# Patient Record
Sex: FEMALE | Race: BLACK OR AFRICAN AMERICAN
[De-identification: names, ages, dates, MRNs, and addresses within clinical notes are randomized per-mention and may not be internally consistent; named-entity substitution may affect disease eponyms.]

---

## 2019-02-14 ENCOUNTER — HOSPITAL ENCOUNTER (EMERGENCY)
Dept: HOSPITAL 19 - COL.ER | Age: 29
Discharge: HOME | End: 2019-02-14
Payer: MEDICAID

## 2019-02-14 VITALS — DIASTOLIC BLOOD PRESSURE: 74 MMHG | SYSTOLIC BLOOD PRESSURE: 96 MMHG | HEART RATE: 91 BPM

## 2019-02-14 VITALS — HEIGHT: 70.98 IN | WEIGHT: 158.29 LBS | BODY MASS INDEX: 22.16 KG/M2

## 2019-02-14 VITALS — TEMPERATURE: 98.5 F

## 2019-02-14 DIAGNOSIS — R10.2: ICD-10-CM

## 2019-02-14 DIAGNOSIS — Z3A.14: ICD-10-CM

## 2019-02-14 DIAGNOSIS — O26.891: Primary | ICD-10-CM

## 2019-02-14 DIAGNOSIS — Z90.89: ICD-10-CM

## 2019-02-14 LAB
PH UR STRIP.AUTO: 8 [PH] (ref 5–8)
RBC # UR: (no result) /HPF
SP GR UR STRIP.AUTO: 1.01 (ref 1–1.03)
SQUAMOUS # URNS: (no result) /HPF
URN COLLECT METHOD CLASS: (no result)

## 2019-03-23 ENCOUNTER — HOSPITAL ENCOUNTER (EMERGENCY)
Dept: HOSPITAL 19 - COL.ER | Age: 29
LOS: 1 days | Discharge: HOME | End: 2019-03-24
Payer: MEDICAID

## 2019-03-23 VITALS — BODY MASS INDEX: 22.04 KG/M2 | WEIGHT: 157.41 LBS | HEIGHT: 70.98 IN

## 2019-03-23 VITALS — TEMPERATURE: 98.3 F

## 2019-03-23 DIAGNOSIS — Z3A.18: ICD-10-CM

## 2019-03-23 DIAGNOSIS — O21.9: Primary | ICD-10-CM

## 2019-03-23 LAB
ANION GAP SERPL CALC-SCNC: 6 MMOL/L (ref 7–16)
BASOPHILS # BLD: 0 10*3/UL (ref 0–0.2)
BASOPHILS NFR BLD AUTO: 0.4 % (ref 0–2)
BUN SERPL-MCNC: 12 MG/DL (ref 7–17)
CALCIUM SERPL-MCNC: 8.6 MG/DL (ref 8.4–10.2)
CHLORIDE SERPL-SCNC: 102 MMOL/L (ref 98–107)
CO2 SERPL-SCNC: 25 MMOL/L (ref 22–30)
CREAT SERPL-SCNC: 0.63 MG/DL (ref 0.52–1.25)
EOSINOPHIL # BLD: 0.2 10*3/UL (ref 0–0.7)
EOSINOPHIL NFR BLD: 2.2 % (ref 0–4)
ERYTHROCYTE [DISTWIDTH] IN BLOOD BY AUTOMATED COUNT: 14.6 % (ref 11.5–14.5)
GLUCOSE SERPL-MCNC: 77 MG/DL (ref 74–106)
GRANULOCYTES # BLD AUTO: 58.6 % (ref 42.2–75.2)
HCT VFR BLD AUTO: 32.2 % (ref 37–47)
HGB BLD-MCNC: 10.2 G/DL (ref 12.5–16)
LYMPHOCYTES # BLD: 2.4 10*3/UL (ref 1.2–3.4)
LYMPHOCYTES NFR BLD: 31.6 % (ref 20–51)
MCH RBC QN AUTO: 26 PG (ref 27–31)
MCHC RBC AUTO-ENTMCNC: 32 G/DL (ref 33–37)
MCV RBC AUTO: 83 FL (ref 80–100)
MONOCYTES # BLD: 0.5 10*3/UL (ref 0.1–0.6)
MONOCYTES NFR BLD AUTO: 6.9 % (ref 1.7–9.3)
NEUTROPHILS # BLD: 4.5 10*3/UL (ref 1.4–6.5)
PLATELET # BLD AUTO: 229 K/MM3 (ref 130–400)
PMV BLD AUTO: 11.3 FL (ref 7.4–10.4)
POTASSIUM SERPL-SCNC: 3.6 MMOL/L (ref 3.4–5)
RBC # BLD AUTO: 3.88 M/MM3 (ref 4.1–5.3)
SODIUM SERPL-SCNC: 133 MMOL/L (ref 137–145)

## 2019-03-24 VITALS — SYSTOLIC BLOOD PRESSURE: 105 MMHG | DIASTOLIC BLOOD PRESSURE: 73 MMHG | HEART RATE: 77 BPM

## 2019-03-24 LAB
PH UR STRIP.AUTO: 7 [PH] (ref 5–8)
RBC # UR: (no result) /HPF
SP GR UR STRIP.AUTO: 1 (ref 1–1.03)
SQUAMOUS # URNS: (no result) /HPF
URN COLLECT METHOD CLASS: (no result)

## 2019-06-17 ENCOUNTER — HOSPITAL ENCOUNTER (INPATIENT)
Dept: HOSPITAL 19 - LDRO | Age: 29
LOS: 1 days | Discharge: HOME | End: 2019-06-18
Attending: OBSTETRICS & GYNECOLOGY | Admitting: OBSTETRICS & GYNECOLOGY
Payer: MEDICAID

## 2019-06-17 VITALS — SYSTOLIC BLOOD PRESSURE: 120 MMHG | DIASTOLIC BLOOD PRESSURE: 73 MMHG | HEART RATE: 90 BPM

## 2019-06-17 VITALS — HEART RATE: 87 BPM | TEMPERATURE: 97.8 F | SYSTOLIC BLOOD PRESSURE: 114 MMHG | DIASTOLIC BLOOD PRESSURE: 69 MMHG

## 2019-06-17 VITALS — SYSTOLIC BLOOD PRESSURE: 123 MMHG | DIASTOLIC BLOOD PRESSURE: 70 MMHG | HEART RATE: 75 BPM

## 2019-06-17 VITALS — HEART RATE: 106 BPM | DIASTOLIC BLOOD PRESSURE: 71 MMHG | SYSTOLIC BLOOD PRESSURE: 116 MMHG

## 2019-06-17 VITALS — SYSTOLIC BLOOD PRESSURE: 126 MMHG | HEART RATE: 110 BPM | DIASTOLIC BLOOD PRESSURE: 71 MMHG

## 2019-06-17 VITALS — DIASTOLIC BLOOD PRESSURE: 60 MMHG | HEART RATE: 114 BPM | SYSTOLIC BLOOD PRESSURE: 135 MMHG

## 2019-06-17 VITALS — DIASTOLIC BLOOD PRESSURE: 87 MMHG | SYSTOLIC BLOOD PRESSURE: 134 MMHG | HEART RATE: 106 BPM

## 2019-06-17 VITALS — SYSTOLIC BLOOD PRESSURE: 117 MMHG | HEART RATE: 96 BPM | DIASTOLIC BLOOD PRESSURE: 69 MMHG

## 2019-06-17 VITALS — SYSTOLIC BLOOD PRESSURE: 123 MMHG | HEART RATE: 112 BPM | DIASTOLIC BLOOD PRESSURE: 70 MMHG

## 2019-06-17 VITALS — HEART RATE: 108 BPM | SYSTOLIC BLOOD PRESSURE: 116 MMHG | DIASTOLIC BLOOD PRESSURE: 60 MMHG

## 2019-06-17 VITALS — WEIGHT: 168.43 LBS | HEIGHT: 70 IN | BODY MASS INDEX: 24.11 KG/M2

## 2019-06-17 VITALS — SYSTOLIC BLOOD PRESSURE: 127 MMHG | DIASTOLIC BLOOD PRESSURE: 64 MMHG | HEART RATE: 113 BPM

## 2019-06-17 VITALS — SYSTOLIC BLOOD PRESSURE: 120 MMHG | DIASTOLIC BLOOD PRESSURE: 65 MMHG | HEART RATE: 108 BPM

## 2019-06-17 VITALS — TEMPERATURE: 97.2 F

## 2019-06-17 VITALS — HEART RATE: 110 BPM | DIASTOLIC BLOOD PRESSURE: 79 MMHG | SYSTOLIC BLOOD PRESSURE: 126 MMHG

## 2019-06-17 VITALS — TEMPERATURE: 97.8 F | SYSTOLIC BLOOD PRESSURE: 114 MMHG | HEART RATE: 87 BPM | DIASTOLIC BLOOD PRESSURE: 69 MMHG

## 2019-06-17 VITALS — DIASTOLIC BLOOD PRESSURE: 70 MMHG | SYSTOLIC BLOOD PRESSURE: 115 MMHG | HEART RATE: 101 BPM

## 2019-06-17 VITALS — DIASTOLIC BLOOD PRESSURE: 71 MMHG | SYSTOLIC BLOOD PRESSURE: 145 MMHG | HEART RATE: 96 BPM

## 2019-06-17 VITALS — DIASTOLIC BLOOD PRESSURE: 65 MMHG | SYSTOLIC BLOOD PRESSURE: 122 MMHG | HEART RATE: 100 BPM

## 2019-06-17 VITALS — DIASTOLIC BLOOD PRESSURE: 73 MMHG | HEART RATE: 103 BPM | SYSTOLIC BLOOD PRESSURE: 124 MMHG

## 2019-06-17 VITALS — HEART RATE: 102 BPM | DIASTOLIC BLOOD PRESSURE: 74 MMHG | SYSTOLIC BLOOD PRESSURE: 130 MMHG

## 2019-06-17 VITALS — HEART RATE: 107 BPM | DIASTOLIC BLOOD PRESSURE: 83 MMHG | SYSTOLIC BLOOD PRESSURE: 131 MMHG

## 2019-06-17 VITALS — HEART RATE: 110 BPM | SYSTOLIC BLOOD PRESSURE: 128 MMHG | DIASTOLIC BLOOD PRESSURE: 63 MMHG

## 2019-06-17 VITALS — SYSTOLIC BLOOD PRESSURE: 132 MMHG | DIASTOLIC BLOOD PRESSURE: 83 MMHG | HEART RATE: 101 BPM

## 2019-06-17 VITALS — SYSTOLIC BLOOD PRESSURE: 113 MMHG | DIASTOLIC BLOOD PRESSURE: 71 MMHG | HEART RATE: 91 BPM

## 2019-06-17 DIAGNOSIS — A56.8: ICD-10-CM

## 2019-06-17 DIAGNOSIS — F41.9: ICD-10-CM

## 2019-06-17 DIAGNOSIS — Z3A.30: ICD-10-CM

## 2019-06-17 DIAGNOSIS — O99.323: ICD-10-CM

## 2019-06-17 DIAGNOSIS — F12.90: ICD-10-CM

## 2019-06-17 LAB
ALBUMIN SERPL-MCNC: 3.4 GM/DL (ref 3.5–5)
ALP SERPL-CCNC: 74 U/L (ref 50–136)
ALT SERPL-CCNC: 6 U/L (ref 9–52)
ANION GAP SERPL CALC-SCNC: 8 MMOL/L (ref 7–16)
AST SERPL-CCNC: 29 U/L (ref 15–37)
BASOPHILS # BLD: 0 10*3/UL (ref 0–0.2)
BASOPHILS NFR BLD AUTO: 0.4 % (ref 0–2)
BILIRUB SERPL-MCNC: 0.3 MG/DL (ref 0–1)
BUN SERPL-MCNC: 7 MG/DL (ref 7–17)
CALCIUM SERPL-MCNC: 8.6 MG/DL (ref 8.4–10.2)
CHLORIDE SERPL-SCNC: 105 MMOL/L (ref 98–107)
CO2 SERPL-SCNC: 25 MMOL/L (ref 22–30)
CREAT SERPL-SCNC: 0.54 UMOL/L (ref 0.52–1.25)
DRUGS UR SCN NOM: NEGATIVE NG/ML
EOSINOPHIL # BLD: 0.1 10*3/UL (ref 0–0.7)
EOSINOPHIL NFR BLD: 1.3 % (ref 0–4)
ERYTHROCYTE [DISTWIDTH] IN BLOOD BY AUTOMATED COUNT: 14.8 % (ref 11.5–14.5)
GLUCOSE SERPL-MCNC: 76 MG/DL (ref 74–106)
GRANULOCYTES # BLD AUTO: 66.4 % (ref 42.2–75.2)
HCT VFR BLD AUTO: 34.3 % (ref 37–47)
HGB BLD-MCNC: 10.9 G/DL (ref 12.5–16)
LYMPHOCYTES # BLD: 2 10*3/UL (ref 1.2–3.4)
LYMPHOCYTES NFR BLD: 22.2 % (ref 20–51)
MCH RBC QN AUTO: 26 PG (ref 27–31)
MCHC RBC AUTO-ENTMCNC: 32 G/DL (ref 33–37)
MCV RBC AUTO: 82 FL (ref 80–100)
MONOCYTES # BLD: 0.8 10*3/UL (ref 0.1–0.6)
MONOCYTES NFR BLD AUTO: 9.3 % (ref 1.7–9.3)
NEUTROPHILS # BLD: 5.9 10*3/UL (ref 1.4–6.5)
PH UR STRIP.AUTO: 7 [PH] (ref 5–8)
PLATELET # BLD AUTO: 209 K/MM3 (ref 130–400)
PMV BLD AUTO: 12.4 FL (ref 7.4–10.4)
POTASSIUM SERPL-SCNC: 3.3 MMOL/L (ref 3.4–5)
PROT SERPL-MCNC: 6.8 GM/DL (ref 6.4–8.2)
RBC # BLD AUTO: 4.17 M/MM3 (ref 4.1–5.3)
RBC # UR: (no result) /HPF
SODIUM SERPL-SCNC: 138 MMOL/L (ref 137–145)
SP GR UR STRIP.AUTO: 1.02 (ref 1–1.03)
SQUAMOUS # URNS: (no result) /HPF
URINE LEUKOCYTE ESTERASE: (no result)
URN COLLECT METHOD CLASS: (no result)
WBC # UR: (no result) /HPF

## 2019-06-17 PROCEDURE — 10D17ZZ EXTRACTION OF PRODUCTS OF CONCEPTION, RETAINED, VIA NATURAL OR ARTIFICIAL OPENING: ICD-10-PCS | Performed by: OBSTETRICS & GYNECOLOGY

## 2019-06-17 NOTE — NUR
Pt up to the bathroom with standby assist and without complications. Pt able
to void. Candie-care done. Pt transferred to room 215 ambulatory. Oriented to
room, bed and call light within reach. Plan of care reviewed.

## 2019-06-17 NOTE — NUR
- Dr. Vargas and [facility] NICU transport team at the bedside. FHR
tracing reviewed.
 
- Mag infusion turned off per Dr. Vargas's order.
 
- Bedside ultrasound done per Dr. Vargas. Vertex positon verified.
 
- SVE per Dr. Vargas 7/80/-2. AROM with moderate amount of clear fluid.
 
- SVE per Dr. Vargas 880/-2.
 
- SVE per Dr. Vargas 980/-2.
 
- SVE per Dr. Vargas 80/-2. Orders to start pitocin received.
 
- Pitocin started at 2ml/hr per order.
 
- SVE per Dr. Vargas 9100/-2
 
- Pt reports feeling pressure. Dr Vargas and NICU transport team back at
the bedside. Pt set up for delivery.
 
- Pushing started.  of viable female . Cords clamped and cut.
Care of the  given to NICU transport team at the bedside.
 
-  of placenta. Retained membranes per Dr. Vargas. Additional staff and
[facility] called to bedside for assistance.
 
- APRIL Marley at the bedside. See anesthesia record for details.
 
- Pitocin started 333ml/hr per order and protocol. Fundus firm and lochia
WNL.

## 2019-06-17 NOTE — NUR
PATIENT TO LR 3. PATIENT COMPLAINS OF LEAKING OF FLUID. PATIENT HAS HAD
MODERATE CONTRACTIONS AS WELL. PATIENT STATES SHE HAD MODERATE BLEEEDING 1
WEEK AGO WITH INTERCOURSE. PATIENT ON EFM, PATIENT BREATHING THROUGH
CONTRACTIONS.

## 2019-06-18 VITALS — DIASTOLIC BLOOD PRESSURE: 72 MMHG | HEART RATE: 100 BPM | TEMPERATURE: 98 F | SYSTOLIC BLOOD PRESSURE: 112 MMHG

## 2019-06-18 VITALS — SYSTOLIC BLOOD PRESSURE: 134 MMHG | TEMPERATURE: 97.5 F | HEART RATE: 85 BPM | DIASTOLIC BLOOD PRESSURE: 65 MMHG

## 2019-06-18 VITALS — TEMPERATURE: 97.1 F | HEART RATE: 97 BPM | SYSTOLIC BLOOD PRESSURE: 104 MMHG | DIASTOLIC BLOOD PRESSURE: 64 MMHG

## 2019-06-18 VITALS — DIASTOLIC BLOOD PRESSURE: 68 MMHG | SYSTOLIC BLOOD PRESSURE: 120 MMHG | HEART RATE: 79 BPM | TEMPERATURE: 98.2 F

## 2019-06-18 NOTE — NUR
Initial visit; Parents thanked  for offering congratulations on the
birth of their daughter. Child was sent to [facility] and Mom states she is
showing signs of improvement.

## 2019-06-18 NOTE — NUR
Pt called for assistance while in the bathroom for concerns with blood clots.
Blood clot seen by this RN to be approximately the size of a plum. Assisted pt
with tawana-care and back to bed. Fundus firm with no more clots
expressed. Vitals signs WNL. Will continue to monitor.

## 2019-06-18 NOTE — NUR
Pt reports she spoke with insurance provided transportation to cancel her
discharge ride. Pt discharged home ambulatory and planning to ride the city
bus home. This RN escorted pt to hospital entrance without complications.

## 2019-06-18 NOTE — NUR
VIVIAN's responded to consult and met with the patient and father of baby, Ritchie Pereira. The patient went into  labor and her UDS was positive for
marijuna. The baby was transferred to the [facility] NICU. The patient
lives in [place] with the father of baby and their three-year-old child. She
states that they recently moved here from [place], due to things being
cheaper. She works at [employer]. She states that she has clothes and WIC set up.
She reports not having a car seat yet, but plans to order one through her
insurance, due to them having car seats for low birth weight babies. SW's
addressed the patient's marijuana use. She states that she plans to quit
smoking, so that she can breast feed. The patient and father of baby do not
have a vehicle, but she states that she utilizes transportation services
through her insurance, [employer], for rides to all
appointments. The patient and Ritchie would like to stay at the [facility]. VIVIAN contacted VIVIAN Cunningham at [facility], for referral to the [facility]. The patient was approved. VIVIAN updated the patient's nurse. The
patient is to discharge today, , and will receive transportation to the
[facility] through her insurance. VIVIAN did make a CPS report. Intake
ID#[id number].

## 2019-07-18 ENCOUNTER — HOSPITAL ENCOUNTER (EMERGENCY)
Dept: HOSPITAL 19 - COL.ER | Age: 29
Discharge: HOME | End: 2019-07-18
Payer: MEDICAID

## 2019-07-18 VITALS — SYSTOLIC BLOOD PRESSURE: 118 MMHG | DIASTOLIC BLOOD PRESSURE: 67 MMHG | TEMPERATURE: 97.7 F

## 2019-07-18 VITALS — BODY MASS INDEX: 23.58 KG/M2 | WEIGHT: 168.43 LBS | HEIGHT: 70.98 IN

## 2019-07-18 VITALS — HEART RATE: 91 BPM
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
## 2019-07-18 DIAGNOSIS — S66.510A: Primary | ICD-10-CM
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
